# Patient Record
Sex: FEMALE | Race: BLACK OR AFRICAN AMERICAN | Employment: OTHER | URBAN - METROPOLITAN AREA
[De-identification: names, ages, dates, MRNs, and addresses within clinical notes are randomized per-mention and may not be internally consistent; named-entity substitution may affect disease eponyms.]

---

## 2019-10-14 ENCOUNTER — APPOINTMENT (OUTPATIENT)
Dept: CT IMAGING | Facility: CLINIC | Age: 79
End: 2019-10-14
Attending: EMERGENCY MEDICINE
Payer: COMMERCIAL

## 2019-10-14 ENCOUNTER — HOSPITAL ENCOUNTER (OUTPATIENT)
Facility: CLINIC | Age: 79
Setting detail: OBSERVATION
Discharge: HOME | End: 2019-10-15
Attending: EMERGENCY MEDICINE | Admitting: HOSPITALIST
Payer: COMMERCIAL

## 2019-10-14 ENCOUNTER — APPOINTMENT (OUTPATIENT)
Dept: CT IMAGING | Facility: CLINIC | Age: 79
End: 2019-10-14
Attending: HOSPITALIST
Payer: COMMERCIAL

## 2019-10-14 DIAGNOSIS — J18.9 COMMUNITY ACQUIRED PNEUMONIA, UNSPECIFIED LATERALITY: Primary | ICD-10-CM

## 2019-10-14 DIAGNOSIS — R55 SYNCOPE, UNSPECIFIED SYNCOPE TYPE: ICD-10-CM

## 2019-10-14 LAB
ANION GAP SERPL CALCULATED.3IONS-SCNC: 4 MMOL/L (ref 3–14)
BASOPHILS # BLD AUTO: 0 10E9/L (ref 0–0.2)
BASOPHILS NFR BLD AUTO: 0.3 %
BUN SERPL-MCNC: 22 MG/DL (ref 7–30)
CALCIUM SERPL-MCNC: 9.9 MG/DL (ref 8.5–10.1)
CHLORIDE SERPL-SCNC: 108 MMOL/L (ref 94–109)
CO2 SERPL-SCNC: 28 MMOL/L (ref 20–32)
CREAT SERPL-MCNC: 1.19 MG/DL (ref 0.52–1.04)
D DIMER PPP FEU-MCNC: 1.1 UG/ML FEU (ref 0–0.5)
DIFFERENTIAL METHOD BLD: ABNORMAL
EOSINOPHIL # BLD AUTO: 0.1 10E9/L (ref 0–0.7)
EOSINOPHIL NFR BLD AUTO: 0.8 %
ERYTHROCYTE [DISTWIDTH] IN BLOOD BY AUTOMATED COUNT: 13.3 % (ref 10–15)
GFR SERPL CREATININE-BSD FRML MDRD: 43 ML/MIN/{1.73_M2}
GLUCOSE SERPL-MCNC: 128 MG/DL (ref 70–99)
HCT VFR BLD AUTO: 37.6 % (ref 35–47)
HGB BLD-MCNC: 12.4 G/DL (ref 11.7–15.7)
IMM GRANULOCYTES # BLD: 0 10E9/L (ref 0–0.4)
IMM GRANULOCYTES NFR BLD: 0 %
INTERPRETATION ECG - MUSE: NORMAL
LYMPHOCYTES # BLD AUTO: 1.7 10E9/L (ref 0.8–5.3)
LYMPHOCYTES NFR BLD AUTO: 25.7 %
MCH RBC QN AUTO: 29.7 PG (ref 26.5–33)
MCHC RBC AUTO-ENTMCNC: 33 G/DL (ref 31.5–36.5)
MCV RBC AUTO: 90 FL (ref 78–100)
MONOCYTES # BLD AUTO: 0.5 10E9/L (ref 0–1.3)
MONOCYTES NFR BLD AUTO: 7.6 %
NEUTROPHILS # BLD AUTO: 4.3 10E9/L (ref 1.6–8.3)
NEUTROPHILS NFR BLD AUTO: 65.6 %
NRBC # BLD AUTO: 0 10*3/UL
NRBC BLD AUTO-RTO: 0 /100
PLATELET # BLD AUTO: 139 10E9/L (ref 150–450)
POTASSIUM SERPL-SCNC: 3.7 MMOL/L (ref 3.4–5.3)
RBC # BLD AUTO: 4.17 10E12/L (ref 3.8–5.2)
SODIUM SERPL-SCNC: 140 MMOL/L (ref 133–144)
TROPONIN I SERPL-MCNC: <0.015 UG/L (ref 0–0.04)
TSH SERPL DL<=0.005 MIU/L-ACNC: 1.81 MU/L (ref 0.4–4)
WBC # BLD AUTO: 6.5 10E9/L (ref 4–11)

## 2019-10-14 PROCEDURE — 96361 HYDRATE IV INFUSION ADD-ON: CPT

## 2019-10-14 PROCEDURE — 25000128 H RX IP 250 OP 636: Performed by: EMERGENCY MEDICINE

## 2019-10-14 PROCEDURE — 36415 COLL VENOUS BLD VENIPUNCTURE: CPT | Performed by: EMERGENCY MEDICINE

## 2019-10-14 PROCEDURE — 99220 ZZC INITIAL OBSERVATION CARE,LEVL III: CPT | Performed by: HOSPITALIST

## 2019-10-14 PROCEDURE — 96360 HYDRATION IV INFUSION INIT: CPT | Mod: 59

## 2019-10-14 PROCEDURE — 80048 BASIC METABOLIC PNL TOTAL CA: CPT | Performed by: EMERGENCY MEDICINE

## 2019-10-14 PROCEDURE — 36415 COLL VENOUS BLD VENIPUNCTURE: CPT | Performed by: PHYSICIAN ASSISTANT

## 2019-10-14 PROCEDURE — 96374 THER/PROPH/DIAG INJ IV PUSH: CPT

## 2019-10-14 PROCEDURE — G0378 HOSPITAL OBSERVATION PER HR: HCPCS

## 2019-10-14 PROCEDURE — 84484 ASSAY OF TROPONIN QUANT: CPT | Performed by: PHYSICIAN ASSISTANT

## 2019-10-14 PROCEDURE — 25800030 ZZH RX IP 258 OP 636: Performed by: PHYSICIAN ASSISTANT

## 2019-10-14 PROCEDURE — 93005 ELECTROCARDIOGRAM TRACING: CPT

## 2019-10-14 PROCEDURE — 25000132 ZZH RX MED GY IP 250 OP 250 PS 637: Performed by: PHYSICIAN ASSISTANT

## 2019-10-14 PROCEDURE — 85379 FIBRIN DEGRADATION QUANT: CPT | Performed by: EMERGENCY MEDICINE

## 2019-10-14 PROCEDURE — 84443 ASSAY THYROID STIM HORMONE: CPT | Performed by: PHYSICIAN ASSISTANT

## 2019-10-14 PROCEDURE — 71275 CT ANGIOGRAPHY CHEST: CPT

## 2019-10-14 PROCEDURE — 85025 COMPLETE CBC W/AUTO DIFF WBC: CPT | Performed by: EMERGENCY MEDICINE

## 2019-10-14 PROCEDURE — 25000125 ZZHC RX 250: Performed by: EMERGENCY MEDICINE

## 2019-10-14 PROCEDURE — 70450 CT HEAD/BRAIN W/O DYE: CPT

## 2019-10-14 PROCEDURE — 25000128 H RX IP 250 OP 636: Performed by: PHYSICIAN ASSISTANT

## 2019-10-14 PROCEDURE — 96375 TX/PRO/DX INJ NEW DRUG ADDON: CPT | Mod: 59

## 2019-10-14 PROCEDURE — 84484 ASSAY OF TROPONIN QUANT: CPT | Performed by: EMERGENCY MEDICINE

## 2019-10-14 PROCEDURE — 99285 EMERGENCY DEPT VISIT HI MDM: CPT | Mod: 25

## 2019-10-14 RX ORDER — ONDANSETRON 2 MG/ML
4 INJECTION INTRAMUSCULAR; INTRAVENOUS EVERY 6 HOURS PRN
Status: DISCONTINUED | OUTPATIENT
Start: 2019-10-14 | End: 2019-10-15 | Stop reason: HOSPADM

## 2019-10-14 RX ORDER — PROCHLORPERAZINE MALEATE 5 MG
5 TABLET ORAL EVERY 6 HOURS PRN
Status: DISCONTINUED | OUTPATIENT
Start: 2019-10-14 | End: 2019-10-15 | Stop reason: HOSPADM

## 2019-10-14 RX ORDER — AMLODIPINE BESYLATE 5 MG/1
10 TABLET ORAL DAILY
Status: DISCONTINUED | OUTPATIENT
Start: 2019-10-14 | End: 2019-10-15 | Stop reason: HOSPADM

## 2019-10-14 RX ORDER — ACETAMINOPHEN 650 MG/1
650 SUPPOSITORY RECTAL EVERY 4 HOURS PRN
Status: DISCONTINUED | OUTPATIENT
Start: 2019-10-14 | End: 2019-10-15 | Stop reason: HOSPADM

## 2019-10-14 RX ORDER — ALENDRONATE SODIUM 70 MG/1
70 TABLET ORAL
COMMUNITY

## 2019-10-14 RX ORDER — ASPIRIN 81 MG/1
81 TABLET ORAL EVERY OTHER DAY
COMMUNITY

## 2019-10-14 RX ORDER — ACETAMINOPHEN 325 MG/1
650 TABLET ORAL EVERY 4 HOURS PRN
Status: DISCONTINUED | OUTPATIENT
Start: 2019-10-14 | End: 2019-10-15 | Stop reason: HOSPADM

## 2019-10-14 RX ORDER — OXYCODONE AND ACETAMINOPHEN 5; 325 MG/1; MG/1
1 TABLET ORAL DAILY PRN
COMMUNITY

## 2019-10-14 RX ORDER — ONDANSETRON 4 MG/1
4 TABLET, ORALLY DISINTEGRATING ORAL EVERY 6 HOURS PRN
Status: DISCONTINUED | OUTPATIENT
Start: 2019-10-14 | End: 2019-10-15 | Stop reason: HOSPADM

## 2019-10-14 RX ORDER — NAPROXEN SODIUM 220 MG
220 TABLET ORAL 2 TIMES DAILY PRN
COMMUNITY

## 2019-10-14 RX ORDER — METOPROLOL TARTRATE 50 MG
100 TABLET ORAL 2 TIMES DAILY
Status: DISCONTINUED | OUTPATIENT
Start: 2019-10-14 | End: 2019-10-15 | Stop reason: HOSPADM

## 2019-10-14 RX ORDER — OXYCODONE AND ACETAMINOPHEN 5; 325 MG/1; MG/1
1 TABLET ORAL DAILY PRN
Status: DISCONTINUED | OUTPATIENT
Start: 2019-10-14 | End: 2019-10-15 | Stop reason: HOSPADM

## 2019-10-14 RX ORDER — LISINOPRIL 40 MG/1
40 TABLET ORAL DAILY
COMMUNITY

## 2019-10-14 RX ORDER — IOPAMIDOL 755 MG/ML
59 INJECTION, SOLUTION INTRAVASCULAR ONCE
Status: COMPLETED | OUTPATIENT
Start: 2019-10-14 | End: 2019-10-14

## 2019-10-14 RX ORDER — CHOLECALCIFEROL (VITAMIN D3) 50 MCG
2000 TABLET ORAL 2 TIMES DAILY
COMMUNITY

## 2019-10-14 RX ORDER — AZITHROMYCIN 250 MG/1
250 TABLET, FILM COATED ORAL DAILY
Status: DISCONTINUED | OUTPATIENT
Start: 2019-10-15 | End: 2019-10-14

## 2019-10-14 RX ORDER — GUAIFENESIN 600 MG/1
1200 TABLET, EXTENDED RELEASE ORAL 2 TIMES DAILY
Status: DISCONTINUED | OUTPATIENT
Start: 2019-10-14 | End: 2019-10-15 | Stop reason: HOSPADM

## 2019-10-14 RX ORDER — CEFTRIAXONE 2 G/1
2 INJECTION, POWDER, FOR SOLUTION INTRAMUSCULAR; INTRAVENOUS EVERY 24 HOURS
Status: DISCONTINUED | OUTPATIENT
Start: 2019-10-14 | End: 2019-10-15 | Stop reason: HOSPADM

## 2019-10-14 RX ORDER — AZITHROMYCIN 500 MG/1
500 INJECTION, POWDER, LYOPHILIZED, FOR SOLUTION INTRAVENOUS ONCE
Status: DISCONTINUED | OUTPATIENT
Start: 2019-10-14 | End: 2019-10-14

## 2019-10-14 RX ORDER — METOPROLOL TARTRATE 100 MG
100 TABLET ORAL 2 TIMES DAILY
COMMUNITY

## 2019-10-14 RX ORDER — PROCHLORPERAZINE 25 MG
12.5 SUPPOSITORY, RECTAL RECTAL EVERY 12 HOURS PRN
Status: DISCONTINUED | OUTPATIENT
Start: 2019-10-14 | End: 2019-10-15 | Stop reason: HOSPADM

## 2019-10-14 RX ORDER — NALOXONE HYDROCHLORIDE 0.4 MG/ML
.1-.4 INJECTION, SOLUTION INTRAMUSCULAR; INTRAVENOUS; SUBCUTANEOUS
Status: DISCONTINUED | OUTPATIENT
Start: 2019-10-14 | End: 2019-10-15 | Stop reason: HOSPADM

## 2019-10-14 RX ORDER — AZITHROMYCIN 500 MG/1
500 INJECTION, POWDER, LYOPHILIZED, FOR SOLUTION INTRAVENOUS ONCE
Status: COMPLETED | OUTPATIENT
Start: 2019-10-14 | End: 2019-10-14

## 2019-10-14 RX ORDER — LISINOPRIL 20 MG/1
40 TABLET ORAL DAILY
Status: DISCONTINUED | OUTPATIENT
Start: 2019-10-14 | End: 2019-10-15 | Stop reason: HOSPADM

## 2019-10-14 RX ORDER — SODIUM CHLORIDE 9 MG/ML
INJECTION, SOLUTION INTRAVENOUS CONTINUOUS
Status: DISCONTINUED | OUTPATIENT
Start: 2019-10-14 | End: 2019-10-15 | Stop reason: HOSPADM

## 2019-10-14 RX ORDER — AMLODIPINE BESYLATE 10 MG/1
10 TABLET ORAL DAILY
COMMUNITY

## 2019-10-14 RX ORDER — SPIRONOLACTONE 25 MG/1
25 TABLET ORAL DAILY
COMMUNITY

## 2019-10-14 RX ORDER — ASPIRIN 81 MG/1
81 TABLET ORAL EVERY OTHER DAY
Status: DISCONTINUED | OUTPATIENT
Start: 2019-10-14 | End: 2019-10-15 | Stop reason: HOSPADM

## 2019-10-14 RX ORDER — CEFTRIAXONE 2 G/1
2 INJECTION, POWDER, FOR SOLUTION INTRAMUSCULAR; INTRAVENOUS EVERY 24 HOURS
Status: DISCONTINUED | OUTPATIENT
Start: 2019-10-14 | End: 2019-10-14

## 2019-10-14 RX ORDER — NITROGLYCERIN 0.4 MG/1
0.4 TABLET SUBLINGUAL EVERY 5 MIN PRN
Status: DISCONTINUED | OUTPATIENT
Start: 2019-10-14 | End: 2019-10-15 | Stop reason: HOSPADM

## 2019-10-14 RX ORDER — LIDOCAINE 40 MG/G
CREAM TOPICAL
Status: DISCONTINUED | OUTPATIENT
Start: 2019-10-14 | End: 2019-10-15 | Stop reason: HOSPADM

## 2019-10-14 RX ORDER — AZITHROMYCIN 250 MG/1
250 TABLET, FILM COATED ORAL DAILY
Status: DISCONTINUED | OUTPATIENT
Start: 2019-10-15 | End: 2019-10-15 | Stop reason: HOSPADM

## 2019-10-14 RX ADMIN — IOPAMIDOL 59 ML: 755 INJECTION, SOLUTION INTRAVENOUS at 10:10

## 2019-10-14 RX ADMIN — SODIUM CHLORIDE 85 ML: 9 INJECTION, SOLUTION INTRAVENOUS at 10:10

## 2019-10-14 RX ADMIN — CEFTRIAXONE SODIUM 2 G: 2 INJECTION, POWDER, FOR SOLUTION INTRAMUSCULAR; INTRAVENOUS at 12:41

## 2019-10-14 RX ADMIN — ACETAMINOPHEN 650 MG: 325 TABLET, FILM COATED ORAL at 20:00

## 2019-10-14 RX ADMIN — GUAIFENESIN 1200 MG: 600 TABLET, EXTENDED RELEASE ORAL at 14:34

## 2019-10-14 RX ADMIN — ASPIRIN 81 MG: 81 TABLET, DELAYED RELEASE ORAL at 14:35

## 2019-10-14 RX ADMIN — LISINOPRIL 40 MG: 20 TABLET ORAL at 14:35

## 2019-10-14 RX ADMIN — SODIUM CHLORIDE: 9 INJECTION, SOLUTION INTRAVENOUS at 12:30

## 2019-10-14 RX ADMIN — METOPROLOL TARTRATE 100 MG: 50 TABLET, FILM COATED ORAL at 20:00

## 2019-10-14 RX ADMIN — METOPROLOL TARTRATE 100 MG: 50 TABLET, FILM COATED ORAL at 14:35

## 2019-10-14 RX ADMIN — GUAIFENESIN 1200 MG: 600 TABLET, EXTENDED RELEASE ORAL at 20:00

## 2019-10-14 RX ADMIN — SODIUM CHLORIDE 1000 ML: 9 INJECTION, SOLUTION INTRAVENOUS at 07:08

## 2019-10-14 RX ADMIN — AZITHROMYCIN MONOHYDRATE 500 MG: 500 INJECTION, POWDER, LYOPHILIZED, FOR SOLUTION INTRAVENOUS at 13:53

## 2019-10-14 RX ADMIN — AMLODIPINE BESYLATE 10 MG: 5 TABLET ORAL at 14:35

## 2019-10-14 ASSESSMENT — ENCOUNTER SYMPTOMS
NAUSEA: 1
VOMITING: 0
BACK PAIN: 0
HEADACHES: 0
DIARRHEA: 0
COUGH: 1

## 2019-10-14 NOTE — PHARMACY-ADMISSION MEDICATION HISTORY
Admission medication history interview status for the 10/14/2019  admission is complete. See EPIC admission navigator for prior to admission medications     Medication history source reliability:Good    Actions taken by pharmacist (provider contacted, etc): [place] ([phone])     Additional medication history information not noted on PTA med list: patient has not taken any aspirin for the past few days due to feeling unwell. She did confirm usually taking it every other day.     Medication reconciliation/reorder completed by provider prior to medication history? No    Time spent in this activity: 15 mins    Prior to Admission medications    Medication Sig Last Dose Taking? Auth Provider   alendronate (FOSAMAX) 70 MG tablet Take 70 mg by mouth every 7 days On Sundays 10/13/2019 Yes Reported, Patient   amLODIPine (NORVASC) 10 MG tablet Take 10 mg by mouth daily 10/13/2019 at AM Yes Reported, Patient   aspirin 81 MG EC tablet Take 81 mg by mouth every other day Past Week Yes Reported, Patient   lisinopril (PRINIVIL/ZESTRIL) 40 MG tablet Take 40 mg by mouth daily 10/13/2019 at AM Yes Reported, Patient   metoprolol tartrate (LOPRESSOR) 100 MG tablet Take 100 mg by mouth 2 times daily 10/13/2019 at PM Yes Reported, Patient   naproxen sodium (ANAPROX) 220 MG tablet Take 220 mg by mouth 2 times daily as needed for moderate pain  at PRN Yes Reported, Patient   oxyCODONE-acetaminophen (PERCOCET) 5-325 MG tablet Take 1 tablet by mouth daily as needed for severe pain  at PRN Yes Reported, Patient   spironolactone (ALDACTONE) 25 MG tablet Take 25 mg by mouth daily 10/13/2019 at AM Yes Reported, Patient   vitamin D3 (CHOLECALCIFEROL) 2000 units (50 mcg) tablet Take 2,000 Units by mouth 2 times daily 10/13/2019 at PM Yes Reported, Patient

## 2019-10-14 NOTE — ED NOTES
Bed: ED20  Expected date: 10/14/19  Expected time: 6:45 AM  Means of arrival: Ambulance  Comments:  IVANNA 442 [id number] syncope

## 2019-10-14 NOTE — ED PROVIDER NOTES
History     Chief Complaint:  Syncope      HPI   Karen Sandhu is a 79 year old female who presents for evaluation after a syncopal episode this morning after getting up. The patient was leaning on the counter, shaky, when she passed out for a few seconds. Her daughter caught her and laid her down on the floor and the patient did not hit her head. The patient also reports having a cold and cough which started today as she was feeling fine yesterday.     Here, the patient does report feeling nauseated, but denies vomiting, diarrhea, chest pain, back pain, or headaches. She notes a remote history of syncope, but nothing recent. Also, the patient was noted to be increasingly weak two days ago while out shopping with her family; she did not lose consciousness at that time. Of note, she is not anticoagulated.     Cardiac/PE/DVT Risk Factors:  The patient has a history of hypertension. She reports a family history of heart disease in her mother. The patient denies any personal or familial history of PE, DVT, or clotting disorder. The patient reports no recent travel, surgery, or other immobilizations.     Allergies:  No known allergies     Medications:    Aspirin 81 mg  Lopressor  Aldactone  Norvasc  Fosamax  Lisinopril    Past Medical History:    Hypertension  Left sided sciatica  Hyperparathyroidism  Chronic kidney disease  Gastroesophageal reflux disease without esophagitis    Past Surgical History:    Tubal ligation     Family History:    Heart Disease (mother)    Social History:  Marital Status:  Single [1]  Negative for tobacco use.  Alcohol use rarely - monthly or less  Family members at bedside.     Review of Systems   HENT: Positive for congestion.    Respiratory: Positive for cough.    Cardiovascular: Negative for chest pain.   Gastrointestinal: Positive for nausea. Negative for diarrhea and vomiting.   Musculoskeletal: Negative for back pain.   Neurological: Positive for syncope. Negative for headaches.    All other systems reviewed and are negative.    Physical Exam     Patient Vitals for the past 24 hrs:   BP Temp Temp src Pulse Heart Rate Resp SpO2   10/14/19 0805 136/69 -- -- 84 91 24 97 %   10/14/19 0750 130/72 -- -- 82 -- -- 98 %   10/14/19 0653 (!) 141/79 97.5  F (36.4  C) Temporal -- 81 20 99 %         Physical Exam  Physical Exam   General:  Sitting on bed with family members at bedside.   HENT:  No obvious trauma to head  Right Ear:  External ear normal.   Left Ear:  External ear normal.   Nose:  Nose normal.   Eyes:  Conjunctivae and EOM are normal. Pupils are equal, round, and reactive.   Neck: Normal range of motion. Neck supple. No tracheal deviation present.   CV:  Normal heart sounds. No murmur heard.  Pulm/Chest: Effort normal and breath sounds normal.   Abd: Soft. No distension. There is no tenderness. There is no rigidity, no rebound and no guarding.   M/S: Normal range of motion.   Neuro: Alert. GCS 15 CN II-XII Grossly intact, no pronator drift, normal finger-nose-finger, visual fields intact by confrontation. Muscle strength is +5 proximal and distal in the bilateral upper and lower extremities. No dysarthria. Normal palm up, palm down.  Skin: Skin is warm and dry. No rash noted. Not diaphoretic.   Psych: Normal mood and affect. Behavior is normal.     Emergency Department Course   ECG:  Indication: Syncope  Time: 0653  Vent. Rate 80 bpm. NV interval 176. QRS duration 108. QT/QTc 386/445. P-R-T axis 85 -30 9.    Normal sinus rhythm.  Left axis deviation  Abnormal ECG. Read time: 0654.    Imaging:  Radiographic findings were communicated with the patient who voiced understanding of the findings.  CT Head without contrast:   No evidence of acute intracranial hemorrhage, mass, or  Herniation, as per radiology.     Laboratory:  CBC: WBC: 6.5, HGB: 12.4, PLT: 139 (L)  BMP: Glucose 128 (H), GFR: 43 (L), o/w WNL (Creatinine: 1.19 (H))  0652 Troponin: <0.015      Interventions:  0708 NS 1L  IV    Emergency Department Course:  Nursing notes and vitals reviewed.   0651: I performed an exam of the patient as documented above.      Lying Orthostatic BP  Lying Orthostatic BP: 151/77 Lying Orthostatic Pulse: 80 bpm  Sitting Orthostatic BP  Sitting Orthostatic BP: 132/74 Sitting Orthostatic Pulse: 81 bpm  Standing Orthostatic BP  Standing Orthostatic BP: 120/70 Standing Orthostatic Pulse: 86 bpm    IV was inserted and blood was drawn for laboratory testing, results above.   Medicine administered as documented above.   The patient was sent for a head CT while in the emergency department, results above.     0819: I consulted with Dr. Patel of the hospitalist services. She is in agreement to accept the patient for admission.    0821: I rechecked the patient and discussed the results of her workup thus far.     Findings and plan explained to the Patient and family who consents to admission. Discussed the patient with Dr. Patel, who will admit the patient to an observation bed for further monitoring, evaluation, and treatment.    I personally reviewed the laboratory and imaging results with the Patient and family and answered all related questions prior to admission.    Impression & Plan      Medical Decision Making:  Karen Sandhu is a very pleasant 79 year old female who presents with a history and clinical exam consistent with syncope.  While vasovagal was the most likely etiology given the history of this patient, I considered a broad differential for their syncope today including cardiac arrhythmia, ACS, aortic stenosis or other acute valvular dysfunction, HOCM, PE, orthostatic hypotension, drugs, medication side effect, situational, carotid hypersensitivity, seizure, TIA, stroke, cerebral insufficiency, vasovagal, etc. the patient screening EKG and troponin are both negative.  The patient did report slight nausea and since myocardial infarctions can present differently in women as we age, the patient be  admitted for serial troponins.  An arrhythmia is still possible.  The patient is maintained on the monitor here and had no evidence of any arrhythmias.  Patient has no signs at this point of an acute stroke, PE, dissection, acute coronary syndrome.  Given lack of a clear etiology, patients age, risk factors would admit patient for further evaluation on telemetry to the observation team.  Dr. Patel has agreed to admit the patient for continued eval and treatment.    Diagnosis:    ICD-10-CM    1. Syncope, unspecified syncope type R55        Disposition:  Admitted to obs under the care of Dr. Patel.     Scribe Disclosure:  Julianne BATISTA, am serving as a scribe on 10/14/2019 at 7:51 AM to personally document services performed by Miller Lares DO based on my observations and the provider's statements to me.     10/14/2019    EMERGENCY DEPARTMENT       Miller Lares DO  10/14/19 0824

## 2019-10-14 NOTE — PLAN OF CARE
Patient arrived from the ED at about 1145. Registered under observation. A&Ox4. VSS on room air. Infrequent, productive cough with scant sputum. IS at bedside. Started on IV rocephin and zithromax. BID mucinex. IVF infusing at 75mL/hr. Up with SBA and gait-belt. No complaints of lightheadedness/dizziness. PT consulted. Denies pain. Regular diet, tolerating fine. Good appetite. Plan is to continue IV antibiotics, IVF, and possibly discharge tomorrow if feeling well.

## 2019-10-14 NOTE — PROGRESS NOTES
RECEIVING UNIT ED HANDOFF REVIEW    ED Nurse Handoff Report was reviewed by: Jolene Fallon RN on October 14, 2019 at 8:52 AM

## 2019-10-14 NOTE — H&P
Admitted:     10/14/2019      PRIMARY CARE PHYSICIAN:  Miller Zurita PA-C.       CHIEF COMPLAINT:  Syncope.      HISTORY OF PRESENT ILLNESS:  Karen Sandhu is an 79-year-old female with a past medical history significant for essential hypertension, hyperparathyroidism, chronic kidney disease stage III, osteoporosis, chronic low back pain with associated left-sided sciatica radiculopathy who presented to [facility] Emergency Department following a syncopal event.      The patient indicated she got up this morning.  When her granddaughter and great granddaughter were coming over, she went and opened up the door and they all walked into the kitchen where she began feeling lightheaded or unwell.  She had to put her head on a  block and then her granddaughter caught her and brought her down to the floor.  The patient indicated that she knew she was on the floor due to the cold tile.  She does not believe she lost consciousness, but if she did, it was very brief and she did not strike her head or injure herself.  The patient was then brought into the Emergency Department to undergo evaluation.      Dr. Lares of the Emergency Department evaluated the patient.  Evaluation included a basic metabolic panel revealing a creatinine of 1.19, GFR 43, glucose of 128, otherwise within normal limits.  Initial troponin was undetectable at less than 0.015.  CBC with differential revealed a white count of 6.5, hemoglobin of 12.4, platelets of 139, otherwise within normal limits.  A head CT without contrast was performed and was unremarkable without evidence of intracranial hemorrhage, mass or herniation.  An EKG was performed which showed sinus rhythm with left axis deviation.  The patient received 1 liter of IV fluids and then was registered to [facility] Observation unit for continued evaluation and management under the Hospitalist Service.      I evaluated the patient in the Emergency Department where she  was lying in bed upon arrival.  She described the episodes that occurred earlier today.  Upon questioning, the patient states that she currently has a cold or upper respiratory infection with ongoing cough with occasional sputum.  She indicates that her daughter also has this.  The patient did recently travel to [place] over 10/04 weekend and returned on 10/08.  During that time, the patient denied any chest pain, shortness of breath or swelling, though does think that she might have overdid it during that trip.  She occasionally has ankle swelling, has soreness all over and did describe being lightheaded this morning.  Otherwise, she denies chest pain, palpitations, shortness of breath, abdominal pain, dysuria.      PAST MEDICAL HISTORY:   1.  Essential hypertension.   2.  Hyperparathyroidism.   3.  Chronic kidney disease, stage III.   4.  Osteoporosis.   5.  Chronic low back pain with associated left-sided sciatica/radiculopathy.      PAST SURGICAL HISTORY:  Tubal ligation.      PRIOR TO ADMIT MEDICATIONS:    Prior to Admission Medications   Prescriptions Last Dose Informant Patient Reported? Taking?   alendronate (FOSAMAX) 70 MG tablet   Yes Yes   Sig: Take 70 mg by mouth every 7 days   amLODIPine (NORVASC) 10 MG tablet   Yes Yes   Sig: Take 10 mg by mouth daily   aspirin 81 MG EC tablet   Yes Yes   Sig: Take 81 mg by mouth every other day   lisinopril (PRINIVIL/ZESTRIL) 40 MG tablet   Yes Yes   Sig: Take 40 mg by mouth daily   metoprolol tartrate (LOPRESSOR) 100 MG tablet   Yes Yes   Sig: Take 100 mg by mouth 2 times daily   naproxen sodium (ANAPROX) 220 MG tablet   Yes Yes   Sig: Take 220 mg by mouth 2 times daily as needed for moderate pain   oxyCODONE-acetaminophen (PERCOCET) 5-325 MG tablet   Yes Yes   Sig: Take 1 tablet by mouth daily as needed for severe pain   spironolactone (ALDACTONE) 25 MG tablet   Yes Yes   Sig: Take 25 mg by mouth daily   vitamin D3 (CHOLECALCIFEROL) 2000 units (50 mcg) tablet    Yes Yes   Sig: Take 2,000 Units by mouth 2 times daily      Facility-Administered Medications: None       ALLERGIES:  NO KNOWN DRUG ALLERGIES.      SOCIAL HISTORY:  The patient currently resides in a house in [place] with her eldest daughter.  She denies any history or current use of tobacco or illicit drugs.  She rarely consumes wine and does not currently utilize a cane or a walker.      FAMILY MEDICAL HISTORY:   1.  Mother is , had heart disease.   2.  Patient has 2 aunts that were diabetics.      REVIEW OF SYSTEMS:  A 10-point review of systems was performed.  All pertinent positives are listed in the History of Present Illness, otherwise is negative.      PHYSICAL EXAMINATION:   VITAL SIGNS:  Temperature is 97.5 degrees Fahrenheit with a blood pressure of 136/69, heart rate of 91 beats per minute, respiratory rate of 24, O2 saturation 97% on room air.     The patient is denying any pain, though indicates she feels achy all over.   GENERAL:  The patient is awake, alert and cooperative, in no apparent distress, alert and oriented x 3.   HEENT:  Normocephalic, atraumatic.  Moist mucous membranes present.  No exudates noted in the posterior pharynx.  Uvula is midline.  Eyes:  Pupils are equal, round, reactive to light.  Extraocular movements are intact.  Normal sclerae.   NECK:  Supple, normal range of motion.  No tracheal deviation.  No cervical lymphadenopathy present.   CARDIOVASCULAR:  Regular rate and rhythm, no rubs, murmurs or gallops appreciated.   PULMONARY:  Lungs are clear to auscultation bilaterally, no wheezes, rhonchus or rales appreciated.   GASTROINTESTINAL:  Bowel sounds are present in all 4 quadrants, soft, nontender, nondistended.   NEUROLOGIC:  Cranial nerves II-XII are grossly intact.  The patient demonstrates equal sensation, coordination and strength in the upper and lower extremities bilaterally.   EXTREMITIES:  No lower extremity edema noted bilaterally.  Calves are nontender to  palpation.      ASSESSMENT AND PLAN:  Karen Sandhu is an 79-year-old female with a past medical history significant for essential hypertension, hyperparathyroidism, chronic kidney disease stage III, osteoporosis, chronic low back pain with left-sided sciatica/radiculopathy who is being registered to Observation due to a syncopal event.   1.  Syncopal event:  Patient's workup so far has been unremarkable with a negative head CT, negative troponin and lab studies that were unremarkable.  In the Emergency Department, the patient was found to be orthostatic and patient received IV fluid bolus.  Following discussion with the patient, it appears she recently traveled to [place] and a D-dimer will be added to the lab studies that were already drawn as well as a TSH with free T4.  We will plan to continue with IV fluids with normal saline at 75 mL an hour.  Will request a physical therapy evaluation.  The patient will be monitored on continuous telemetry and will continue to monitor orthostatic blood pressures as well as trend serial troponin.   ADDENDUM:  D-Dimer elevated at 1.1.  Will order stat chest CT with contrast to rule out PE.    2.  Essential hypertension:  The patient's blood pressures appear to be stable at this point.  We will plan to hold prior to admit spironolactone 25 mg daily while patient is receiving IV fluids.  Otherwise, will resume prior to admit Lopressor 100 mg 2 times daily, Amlodipine 10 mg daily and lisinopril 40 mg daily with hold parameters in place.  We will also plan to recheck a basic metabolic panel in the morning.   3.  Chronic kidney disease, stage III:  Baseline creatinine appears to hover between 1.2 and 1.35.  Creatinine at this time is improved at 1.19.  We will continue with IV fluids and monitor renal function.   4.  Hyperparathyroidism:  This is being followed in the outpatient setting.  The patient's last PTH was drawn in July and elevated at 112, which was decreased from  previous study.  No further interventions are required at this time.  The patient will continue to follow up in the outpatient setting.   5.  Osteoporosis:  The patient is compliant with weekly Fosamax.  Will hold this medication due to observation status and it can be resumed at time of discharge.   6.  Chronic low back pain with associated left-sided sciatica/radiculopathy:  Patient indicates that she uses as needed Aleve and as needed Percocet.  Will continue with as needed Percocet and add as needed Tylenol.   7.  Deep vein thrombosis prophylaxis: I believe patient will have a short stay and would encourage ambulation.      CODE STATUS:  Discussed with the patient.  She elected to be full code, but indicated that she would not want to be on prolonged life support.      DISPOSITION:  The patient is being registered under Observation.  I believe patient will likely discharge home once workup is completed and patient has no reoccurrence of lightheadedness.      The patient was discussed with Dr. Maya, who agrees with the assessment and plan as stated above.  Dr. Maya will evaluate the patient independently.         MALIA MAYA DO       As dictated by THEODORE BELLO PA-C            D: 10/14/2019   T: 10/14/2019   MT:       Name:     FELIPE DORANTES   MRN:      [id number]        Account:      [id number]   :      1940        Admitted:     10/14/2019                   Document: [id number]       cc: Miller Zurita PA-C

## 2019-10-14 NOTE — PLAN OF CARE
AOx4. VSS on RA. LS: clear. Infrequent, productive cough with scant sputum. IS encouraged. No c/o lightheadedness/dizziness. Regular diet. Up SBA +GB. R PIV infusing NS@75. Plan is to continue IV antibiotics, possibly discharge tomorrow. Continue to monitor.

## 2019-10-14 NOTE — ED TRIAGE NOTES
Pt was walking in the kitchen and was talking to her granddaughter reports she felt weak and passed out. Pt granddaughter helped her to the ground. Pt had a similar episode 2days ago at [facility]

## 2019-10-14 NOTE — ED NOTES
"[facility]  ED Nurse Handoff Report    ED Chief complaint: Syncope      ED Diagnosis:   Final diagnoses:   Syncope, unspecified syncope type       Code Status: Full Code    Allergies: Allergies no known allergies    Activity level - Baseline/Home:  Independent  Activity Level - Current:   Stand with Assist    Patient's Preferred language: English   Needed?: No    Isolation: No  Infection: Not Applicable  Bariatric?: No    Vital Signs:   Vitals:    10/14/19 0653 10/14/19 0750 10/14/19 0805   BP: (!) 141/79 130/72 136/69   Pulse:  82 84   Resp: 20  24   Temp: 97.5  F (36.4  C)     TempSrc: Temporal     SpO2: 99% 98% 97%       Cardiac Rhythm: ,   Cardiac  Cardiac Rhythm: Normal sinus rhythm    Pain level:      Is this patient confused?: No   Does this patient have a guardian?  No         If yes, is there guardianship documents in the Epic \"Code/ACP\" activity?  N/A         Guardian Notified?  N/A  [place] - Suicide Severity Rating Scale Completed?  Yes  If yes, what color did the patient score?  White    Patient Report: Initial Complaint: Pt presents by EMS after a syncopal episode this morning after getting up. The patient was leaning on the counter and passed out for a few seconds. Her daughter caught her and laid her down on the floor. Pt did not hit her head. Pt had similar episode 2 days ago while shopping.     Focused Assessment: Pt c/o feeling weak and nauseated while in the ED. Pt's SBP went from 150 to 120 when changing position from lying to standing.   Tests Performed: bloodwork, EKG, head CT  Abnormal Results: see EPIC  Treatments provided: 1L NS    Family Comments: family at bedside    OBS brochure/video discussed/provided to patient/family: Yes              Name of person given brochure if not patient: n/a              Relationship to patient: n/a    ED Medications:   Medications   0.9% sodium chloride BOLUS (0 mLs Intravenous Stopped 10/14/19 0810)       Drips infusing?:  " No    For the majority of the shift this patient was Green.   Interventions performed were n/a.    Severe Sepsis OR Septic Shock Diagnosis Present: No    To be done/followed up on inpatient unit:  n/a    ED NURSE PHONE NUMBER: [phone]

## 2019-10-15 ENCOUNTER — APPOINTMENT (OUTPATIENT)
Dept: PHYSICAL THERAPY | Facility: CLINIC | Age: 79
End: 2019-10-15
Attending: PHYSICIAN ASSISTANT
Payer: COMMERCIAL

## 2019-10-15 VITALS
RESPIRATION RATE: 16 BRPM | WEIGHT: 150.5 LBS | HEART RATE: 84 BPM | DIASTOLIC BLOOD PRESSURE: 66 MMHG | OXYGEN SATURATION: 96 % | TEMPERATURE: 95.5 F | SYSTOLIC BLOOD PRESSURE: 149 MMHG

## 2019-10-15 LAB
ANION GAP SERPL CALCULATED.3IONS-SCNC: 3 MMOL/L (ref 3–14)
BUN SERPL-MCNC: 17 MG/DL (ref 7–30)
CALCIUM SERPL-MCNC: 9.5 MG/DL (ref 8.5–10.1)
CHLORIDE SERPL-SCNC: 110 MMOL/L (ref 94–109)
CO2 SERPL-SCNC: 27 MMOL/L (ref 20–32)
CREAT SERPL-MCNC: 1.11 MG/DL (ref 0.52–1.04)
GFR SERPL CREATININE-BSD FRML MDRD: 46 ML/MIN/{1.73_M2}
GLUCOSE SERPL-MCNC: 93 MG/DL (ref 70–99)
POTASSIUM SERPL-SCNC: 3.8 MMOL/L (ref 3.4–5.3)
SODIUM SERPL-SCNC: 140 MMOL/L (ref 133–144)
TROPONIN I SERPL-MCNC: <0.015 UG/L (ref 0–0.04)

## 2019-10-15 PROCEDURE — 40000275 ZZH STATISTIC RCP TIME EA 10 MIN

## 2019-10-15 PROCEDURE — 25000132 ZZH RX MED GY IP 250 OP 250 PS 637: Performed by: PHYSICIAN ASSISTANT

## 2019-10-15 PROCEDURE — 84484 ASSAY OF TROPONIN QUANT: CPT | Performed by: PHYSICIAN ASSISTANT

## 2019-10-15 PROCEDURE — 36415 COLL VENOUS BLD VENIPUNCTURE: CPT | Performed by: PHYSICIAN ASSISTANT

## 2019-10-15 PROCEDURE — 25000128 H RX IP 250 OP 636: Performed by: PHYSICIAN ASSISTANT

## 2019-10-15 PROCEDURE — 80048 BASIC METABOLIC PNL TOTAL CA: CPT | Performed by: PHYSICIAN ASSISTANT

## 2019-10-15 PROCEDURE — 96376 TX/PRO/DX INJ SAME DRUG ADON: CPT

## 2019-10-15 PROCEDURE — 99217 ZZC OBSERVATION CARE DISCHARGE: CPT | Performed by: INTERNAL MEDICINE

## 2019-10-15 PROCEDURE — 94799 UNLISTED PULMONARY SVC/PX: CPT

## 2019-10-15 PROCEDURE — G0378 HOSPITAL OBSERVATION PER HR: HCPCS

## 2019-10-15 PROCEDURE — 97161 PT EVAL LOW COMPLEX 20 MIN: CPT | Mod: GP | Performed by: PHYSICAL THERAPIST

## 2019-10-15 RX ORDER — AZITHROMYCIN 250 MG/1
250 TABLET, FILM COATED ORAL DAILY
Qty: 4 TABLET | Refills: 0 | Status: SHIPPED | OUTPATIENT
Start: 2019-10-16

## 2019-10-15 RX ORDER — GUAIFENESIN 600 MG/1
1200 TABLET, EXTENDED RELEASE ORAL 2 TIMES DAILY
Qty: 20 TABLET | Refills: 0 | Status: SHIPPED | OUTPATIENT
Start: 2019-10-15 | End: 2019-10-15

## 2019-10-15 RX ORDER — CEFDINIR 300 MG/1
300 CAPSULE ORAL 2 TIMES DAILY
Qty: 14 CAPSULE | Refills: 0 | Status: SHIPPED | OUTPATIENT
Start: 2019-10-15 | End: 2019-10-22

## 2019-10-15 RX ORDER — GUAIFENESIN 600 MG/1
1200 TABLET, EXTENDED RELEASE ORAL 2 TIMES DAILY
Qty: 20 TABLET | Refills: 0 | Status: SHIPPED | OUTPATIENT
Start: 2019-10-15

## 2019-10-15 RX ADMIN — METOPROLOL TARTRATE 100 MG: 50 TABLET, FILM COATED ORAL at 09:02

## 2019-10-15 RX ADMIN — LISINOPRIL 40 MG: 20 TABLET ORAL at 09:02

## 2019-10-15 RX ADMIN — AMLODIPINE BESYLATE 10 MG: 5 TABLET ORAL at 09:04

## 2019-10-15 RX ADMIN — CEFTRIAXONE SODIUM 2 G: 2 INJECTION, POWDER, FOR SOLUTION INTRAMUSCULAR; INTRAVENOUS at 12:23

## 2019-10-15 RX ADMIN — GUAIFENESIN 1200 MG: 600 TABLET, EXTENDED RELEASE ORAL at 09:02

## 2019-10-15 RX ADMIN — AZITHROMYCIN 250 MG: 250 TABLET, FILM COATED ORAL at 09:04

## 2019-10-15 NOTE — PROGRESS NOTES
Patient discharged at this time. AVS paperwork reviewed with patient and family. Scripts sent with patient. Patient aware to fill prescriptions at pharmacy on the way home. Patient's daughter to transport patient from the hospital. Patient will be staying with family due to broken furnace. All belongings/valuables with patient at time of departure. Patient aware to make follow up appointment with PCP within 7 days after discharge.

## 2019-10-15 NOTE — PROVIDER NOTIFICATION
Pt on room air sat 96%. Pt states some times able to cough up secretions. Pt has Acapella and understands hoe to use it.     10/15/19 1300   Quick Adds   Quick Adds Acapella   Oxygen Therapy   SpO2 96 %   O2 Device None (Room air)   Resp Acapella   Acapella Done   Repetitions (indicate number of repetitions) 10   Resistance Level (indicate level) 5    Cj  RT

## 2019-10-15 NOTE — PROVIDER NOTIFICATION
MD Notification    Notified Person: MD    Notified Person Name: Dr. Haynes    Notification Date/Time: 10/14/19 19:49pm    Notification Interaction: Talked on telephone     Purpose of Notification: new temperature of 100.2    Orders Received: No new orders. Continue with current plan.     Comments:

## 2019-10-15 NOTE — PLAN OF CARE
Discharge Planner PT   Patient plan for discharge: To her daughters house because her own furnace went out.   Current status: Patient seen for initial eval. She is independent with bed mobility, transfers and amb on level surface 100 feet. Able to go up and down a flight of steps without assist. No further PT indicated.   Barriers to return to prior living situation: none  Recommendations for discharge: home  Rationale for recommendations: Patient is currently at baseline with functional mobility.        Entered by: Elise Nguyen 10/15/2019 11:24 AM

## 2019-10-15 NOTE — PLAN OF CARE
A/ox4. Tmax 100.1- on call MD notified. Gave prn tylenol x1- recheck of temp 98.4. OVSS on RA. Orthostatics negative on shift. Denies pain/SOB/or nausea. LS clear. BS active, +flatus. Voiding via bathroom. Up with SBA. IVF-infusing. On intermittent abx. Possible discharge today. Continue to monitor.

## 2019-10-15 NOTE — DISCHARGE SUMMARY
[facility]    Discharge Summary  Hospitalist    Date of Admission:  10/14/2019  Date of Discharge:  10/15/2019  Discharging Provider: Daniella Link MD    Discharge Diagnoses   Syncope  Community-acquired pneumonia    History of Present Illness   Karen Sandhu is an 79-year-old female with a past medical history significant for essential hypertension, hyperparathyroidism, chronic kidney disease stage III, osteoporosis, chronic low back pain with associated left-sided sciatica radiculopathy who presented to [facility] Emergency Department following a syncopal event.      The patient indicated she got up this morning.  When her granddaughter and great granddaughter were coming over, she went and opened up the door and they all walked into the kitchen where she began feeling lightheaded or unwell.  She had to put her head on a  block and then her granddaughter caught her and brought her down to the floor.  The patient indicated that she knew she was on the floor due to the cold tile.  She does not believe she lost consciousness, but if she did, it was very brief and she did not strike her head or injure herself.  The patient was then brought into the Emergency Department to undergo evaluation.      Dr. Lares of the Emergency Department evaluated the patient.  Evaluation included a basic metabolic panel revealing a creatinine of 1.19, GFR 43, glucose of 128, otherwise within normal limits.  Initial troponin was undetectable at less than 0.015.  CBC with differential revealed a white count of 6.5, hemoglobin of 12.4, platelets of 139, otherwise within normal limits.  A head CT without contrast was performed and was unremarkable without evidence of intracranial hemorrhage, mass or herniation.  An EKG was performed which showed sinus rhythm with left axis deviation.  The patient received 1 liter of IV fluids and then was registered to [facility] Observation unit for continued  evaluation and management under the Hospitalist Service.     Hospital Course   Karen Sandhu was admitted on 10/14/2019.  The following problems were addressed during her hospitalization:    Active Problems:    Syncope  Patient admitted with complaints of cough, weakness, one episode of syncope, CT of the chest was obtained, which ruled out PE, it identified scattered areas of infiltrates indicating possible community-acquired pneumonia, she was started on IV Rocephin and Zithromax, her O2 needs were minimal, it was weaned off of her way, she worked with PT and was cleared to be discharged home she felt back to baseline prior to discharge, she had mentioned that she did not have a her furnace working at home so we discussed about her going to her daughter's house so that she will not get exposed to extremes of temperatures with the current pneumonia.        Daniella Link MD    Significant Results and Procedures       Pending Results     Unresulted Labs Ordered in the Past 30 Days of this Admission     No orders found for last 31 day(s).          Code Status   Full Code       Primary Care Physician   Miller Zurita    Physical Exam   Temp: 95.5  F (35.3  C) Temp src: Axillary(patient eating breakfast) BP: (!) 149/66 Pulse: 84 Heart Rate: 77 Resp: 16 SpO2: 96 % O2 Device: None (Room air)    Vitals:    10/14/19 0958 10/15/19 0623   Weight: 65.8 kg (145 lb) 68.3 kg (150 lb 8 oz)     Vital Signs with Ranges  Temp:  [95.5  F (35.3  C)-100.2  F (37.9  C)] 95.5  F (35.3  C)  Pulse:  [77-84] 84  Heart Rate:  [72-83] 77  Resp:  [14-16] 16  BP: (120-149)/(43-73) 149/66  SpO2:  [96 %-100 %] 96 %  I/O last 3 completed shifts:  In: 2219 [P.O.:240; I.V.:979; IV Piggyback:1000]  Out: 400 [Urine:400]    The patient was examined on the day of discharge.    Discharge Disposition   Discharged to home  Condition at discharge: Stable    Consultations This Hospital Stay   PHYSICAL THERAPY ADULT IP CONSULT    Time Spent on this  Encounter   I, Daniella Link MD, personally saw the patient today and spent greater than 30 minutes discharging this patient.    Discharge Orders      Reason for your hospital stay    Pneumonia     Follow-up and recommended labs and tests     Follow up with primary care provider, Miller Zurita, within 7 days to evaluate medication change and for hospital follow- up.  No follow up labs or test are needed.     Activity    Your activity upon discharge: activity as tolerated     Discharge Instructions    Patient has her furnace damaged, we discussed about staying with her daughters until her furnace is fixed to go back to her home.     Diet    Follow this diet upon discharge: Orders Placed This Encounter      Combination Diet Low Saturated Fat Na <2400mg Diet, No Caffeine Diet     Discharge Medications   Current Discharge Medication List      START taking these medications    Details   azithromycin (ZITHROMAX) 250 MG tablet Take 1 tablet (250 mg) by mouth daily  Qty: 4 tablet, Refills: 0    Associated Diagnoses: Community acquired pneumonia, unspecified laterality      cefdinir (OMNICEF) 300 MG capsule Take 1 capsule (300 mg) by mouth 2 times daily for 7 days  Qty: 14 capsule, Refills: 0    Associated Diagnoses: Community acquired pneumonia, unspecified laterality      guaiFENesin (MUCINEX) 600 MG 12 hr tablet Take 2 tablets (1,200 mg) by mouth 2 times daily  Qty: 20 tablet, Refills: 0    Associated Diagnoses: Community acquired pneumonia, unspecified laterality         CONTINUE these medications which have NOT CHANGED    Details   alendronate (FOSAMAX) 70 MG tablet Take 70 mg by mouth every 7 days On Sundays      amLODIPine (NORVASC) 10 MG tablet Take 10 mg by mouth daily      aspirin 81 MG EC tablet Take 81 mg by mouth every other day      lisinopril (PRINIVIL/ZESTRIL) 40 MG tablet Take 40 mg by mouth daily      metoprolol tartrate (LOPRESSOR) 100 MG tablet Take 100 mg by mouth 2 times daily      naproxen  sodium (ANAPROX) 220 MG tablet Take 220 mg by mouth 2 times daily as needed for moderate pain      oxyCODONE-acetaminophen (PERCOCET) 5-325 MG tablet Take 1 tablet by mouth daily as needed for severe pain      spironolactone (ALDACTONE) 25 MG tablet Take 25 mg by mouth daily      vitamin D3 (CHOLECALCIFEROL) 2000 units (50 mcg) tablet Take 2,000 Units by mouth 2 times daily           Allergies   No Known Allergies  Data   Most Recent 3 CBC's:  Recent Labs   Lab Test 10/14/19  0652   WBC 6.5   HGB 12.4   MCV 90   *      Most Recent 3 BMP's:  Recent Labs   Lab Test 10/15/19  0646 10/14/19  0652    140   POTASSIUM 3.8 3.7   CHLORIDE 110* 108   CO2 27 28   BUN 17 22   CR 1.11* 1.19*   ANIONGAP 3 4   DORINDA 9.5 9.9   GLC 93 128*     Most Recent 2 LFT's:No lab results found.  Most Recent INR's and Anticoagulation Dosing History:  Anticoagulation Dose History     There is no flowsheet data to display.        Most Recent 3 Troponin's:  Recent Labs   Lab Test 10/15/19  0020 10/14/19  1825 10/14/19  1239   TROPI <0.015 <0.015 <0.015     Most Recent Cholesterol Panel:No lab results found.  Most Recent 6 Bacteria Isolates From Any Culture (See EPIC Reports for Culture Details):No lab results found.  Most Recent TSH, T4 and A1c Labs:  Recent Labs   Lab Test 10/14/19  1239   TSH 1.81     Results for orders placed or performed during the hospital encounter of 10/14/19   CT Head w/o Contrast    Narrative    CT SCAN OF THE HEAD WITHOUT CONTRAST October 14, 2019 7:44 AM     HISTORY: Syncope.    TECHNIQUE: Axial images of the head and coronal reformations without  IV contrast material. Radiation dose for this scan was reduced using  automated exposure control, adjustment of the mA and/or kV according  to patient size, or iterative reconstruction technique.    COMPARISON: None.    FINDINGS: There is no evidence of intracranial hemorrhage, mass, acute  infarct or anomaly. The ventricles are normal in size, shape  and  configuration. Incidental cavum septum pellucidum et verge. Mild  diffuse brain parenchymal volume loss. Mild patchy periventricular  white matter hypodensities which are nonspecific, but likely related  to chronic microvascular ischemic disease. Scattered intracranial  vascular calcifications are noted.    Polypoid mucosal thickening in the right frontal sinus. The remainder  of the visualized paranasal sinuses are free of significant disease.  Mastoid and middle ear cavities are clear. The bony calvarium and  bones of the skull base appear intact.       Impression    IMPRESSION: No evidence of acute intracranial hemorrhage, mass, or  herniation.     MART SIMPSON MD   CT Chest Pulmonary Embolism w Contrast    Narrative    CT CHEST PULMONARY EMBOLISM WITH CONTRAST  10/14/2019 10:20 AM    HISTORY:  PE suspected, intermediate probability, positive D-dimer.    TECHNIQUE: Scans obtained from the apices through the diaphragm with  IV contrast. 59 mL Isovue-370 IV injected. Radiation dose for this  scan was reduced using automated exposure control, adjustment of the  mA and/or kV according to patient size, or iterative reconstruction  technique.    COMPARISON:  None available.    FINDINGS:  Vascular: No acute thoracic aortic abnormality. No evidence of  pulmonary emboli.     Lungs and pleura: Scattered patchy pulmonary opacities; the largest  are in the posterior aspect of the right lower lobe and in the  anterior aspect of the left upper lobe.    Chest/mediastinum: Partially visualized 8 mm right lower pole thyroid  nodule (series 5 image 1). No cardiomegaly or pericardial effusion. No  significant mediastinal or hilar lymph nodes.    Upper abdomen: Limited evaluation of the upper abdomen due to lack of  coverage. 3.4 cm right upper pole renal cyst.    Bones and soft tissue: Multilevel degenerative changes of the  visualized spine. 1 cm subcutaneous soft tissue nodule in the medial  aspect of the anterior right  chest wall (series 5 image 45), likely  sebaceous cyst.      Impression    IMPRESSION:   1. Scattered patchy pulmonary opacities most prominent in the right  lower lobe and left upper lobe, concerning for infection.  2. No evidence of pulmonary emboli.    CASSIE CARDOSO MD

## 2023-01-02 NOTE — PROGRESS NOTES
10/15/19 1054   Quick Adds   Type of Visit Initial PT Evaluation   Living Environment   Lives With child(elizabet), adult   Living Arrangements house   Home Accessibility stairs to enter home;stairs within home   Transportation Anticipated family or friend will provide;other (see comments)  (Grandchildren or the bus. )   Living Environment Comment Walk right in on level surface from the back of the house. Once inside she has 3 steps to the kitchen. Does have a flight to the upstairs. The 3 steps do not have a rail, the flight does.    Self-Care   Usual Activity Tolerance good   Regular Exercise Yes   Activity/Exercise Type walking   Functional Level Prior   Ambulation 0-->independent   Transferring 0-->independent   Toileting 0-->independent   Bathing 0-->independent   Communication 0-->understands/communicates without difficulty   Swallowing 0-->swallows foods/liquids without difficulty   Cognition 0 - no cognition issues reported   Fall history within last six months yes   Number of times patient has fallen within last six months 1  (Due to syncopal episode that prompted admission. )   General Information   Onset of Illness/Injury or Date of Surgery - Date 10/14/19   Referring Physician Anatoliy Barbour   Patient/Family Goals Statement Plans to go to her daughters because her furnace went out.    Pertinent History of Current Problem (include personal factors and/or comorbidities that impact the POC) Karen Sandhu is an 79-year-old female with a past medical history significant for essential hypertension, hyperparathyroidism, chronic kidney disease stage III, osteoporosis, chronic low back pain with associated left-sided sciatica radiculopathy who presented to [facility] Emergency Department following a syncopal event.    Precautions/Limitations no known precautions/limitations   General Observations Very pleasant.    Cognitive Status Examination   Orientation orientation to person, place and time   Level of  Consciousness alert   Follows Commands and Answers Questions 100% of the time   Personal Safety and Judgment intact   Memory intact   Pain Assessment   Patient Currently in Pain No   Integumentary/Edema   Integumentary/Edema no deficits were identifed   Posture    Posture Not impaired   Range of Motion (ROM)   ROM Quick Adds No deficits were identified   Strength   Manual Muscle Testing Quick Adds No deficits were identified   Bed Mobility   Bed Mobility Comments Independent   Transfer Skills   Transfer Comments Independent   Gait   Gait Comments Independent 500 feet   Balance   Balance Comments Occasional extra step during amb but able to correct without assist.    Clinical Impression   Criteria for Skilled Therapeutic Intervention no problems identified which require skilled intervention   Clinical Presentation Stable/Uncomplicated   Clinical Decision Making (Complexity) Low complexity   Therapy Frequency   (No further therapy planned. )   Anticipated Discharge Disposition Home  (Actually to [facility] home due to her heat being out. )   Risk & Benefits of therapy have been explained Yes   Patient, Family & other staff in agreement with plan of care Yes   Total Evaluation Time   Total Evaluation Time (Minutes) 16      no